# Patient Record
Sex: FEMALE | Race: WHITE | Employment: OTHER | URBAN - METROPOLITAN AREA
[De-identification: names, ages, dates, MRNs, and addresses within clinical notes are randomized per-mention and may not be internally consistent; named-entity substitution may affect disease eponyms.]

---

## 2018-11-28 ENCOUNTER — DOCUMENTATION ONLY (OUTPATIENT)
Dept: SURGERY | Age: 63
End: 2018-11-28

## 2018-11-28 NOTE — PROGRESS NOTES
Per [facility] requirements;  E-mail and letter sent for follow up appointment. [employer] [facility] [place]  [employer] [facility]  [facility]      Dear Patient,    Your health is our main concern. It is important for your health to have follow-up lab work and to see you surgeon at 2 months, 4 months, 6 months, 9 months and annually after your weight loss surgery. Additionally, the Department of Bariatric Surgery at our hospital is a member of the [employer] [address] Surgical Quality Improvement Program ([facility] NSQIP). As a participant in this program, we gather information on the outcomes of our patients after surgery. Please call the office for a follow up appointment at [phone]. If you have moved out of the area or have changed surgeons please call us and let us know the name of your doctor. Your health and feedback are important to us. We greatly appreciate your response.        Thank you,  [facility] [address] [facility]

## 2018-12-11 ENCOUNTER — OFFICE VISIT (OUTPATIENT)
Dept: SURGERY | Age: 63
End: 2018-12-11

## 2018-12-11 VITALS
HEART RATE: 77 BPM | SYSTOLIC BLOOD PRESSURE: 140 MMHG | OXYGEN SATURATION: 99 % | HEIGHT: 63 IN | DIASTOLIC BLOOD PRESSURE: 70 MMHG | WEIGHT: 186 LBS | BODY MASS INDEX: 32.96 KG/M2 | TEMPERATURE: 98.2 F

## 2018-12-11 DIAGNOSIS — K90.9 INTESTINAL MALABSORPTION, UNSPECIFIED TYPE: Primary | ICD-10-CM

## 2018-12-11 DIAGNOSIS — Z98.84 S/P BARIATRIC SURGERY: ICD-10-CM

## 2018-12-11 RX ORDER — SAME BUTANEDISULFONATE/BETAINE 400-600 MG
POWDER IN PACKET (EA) ORAL
COMMUNITY
End: 2018-12-11

## 2018-12-11 NOTE — PROGRESS NOTES
Subjective:      Remberto Colin is a 66 y.o. female is now 4 years status post laparoscopic sleeve gastrectomy. Doing well overall. She has lost a total of 31 pounds since surgery. Body mass index is 32.95 kg/m². Has lost 35% of EBW. Currently on a solid food diet without difficulty, reports weight regain and denies vomiting and abdominal pain. Taking in >40oz coffee daily. If she is not drinking coffee, she is drinking tea. Sources of protein include eggs, chicken, beef. Has been eating chips, peanut butter, chocolate. 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  She has not been exercising at this time. She is sleeping 5-6 hours each night and not always at one time. A lot of time she will wake up in the middle of the night and have difficulty going back to sleep. She has been drinking on average 1 glass of wine daily. Bowel movements are regular when eating flax seed and cosme seed meal. The patient is not having any pain. . The patient is compliant with multivitamins, calcium, Vit D and B12 supplements.      Weight Loss Metrics 12/11/2018 12/8/2016 4/8/2016 10/23/2015 6/30/2015 4/7/2015 2/5/2015   Today's Wt 186 lb 160 lb 155 lb 147 lb 154 lb 170 lb 191 lb   BMI 32.95 kg/m2 28.34 kg/m2 27.46 kg/m2 26.05 kg/m2 27.29 kg/m2 30.12 kg/m2 33.84 kg/m2        Patient Active Problem List   Diagnosis Code    Asthma J45.909    Arthritis M19.90    S/P bariatric surgery Z98.84    Intestinal malabsorption K90.9    Unspecified intestinal malabsorption K90.9        Past Medical History:   Diagnosis Date    Arthritis     ankles, knees, back, hands    Asthma     Diabetes mellitus ([address]     Dx 6 years    GERD (gastroesophageal reflux disease)     Hypertension     Morbid obesity ([address]     Other ill-defined conditions(799.89)     kidney stone    Status post bariatric surgery     terracina / sleeve resection    Unspecified intestinal malabsorption        Past Surgical History:   Procedure Laterality Date    EXCISION, INFEC GRAFT, ABDOMEN  2018    brown recluse spider bite, excision of necrotic tissue from RLQ    HX CATARACT REMOVAL      HX  SECTION      HX GYN      reanastomosis of tubes    HX HEENT      deviated septum and sinus     HX HEENT      retina repair    HX LITHOTRIPSY      HX OTHER SURGICAL      renal stone removal    HX TUBAL LIGATION      IA LAP, HERMINIO RESTRICT PROC, LONGITUDINAL GASTRECTOMY         Current Outpatient Medications   Medication Sig Dispense Refill    OTHER Indications: flax seed oil for constipation      cyanocobalamin (VITAMIN B-12) 1,000 mcg sublingual tablet Take 1 Tab by mouth daily. Sublingual 30 Tab 0    multivitamins (CHEWABLE MULTI VITAMIN) chew Take 2 Tabs by mouth daily. Chewable Tabs 180 Tab 0    Calcium Citrate-Vitamin D3 500 mg calcium -400 unit chew Take 1 Tab by mouth three (3) times daily. Space each dose at least 2 hours from the next dose 30 Tab 0    BIOTIN PO Take  by mouth.  EPINEPHrine (EPIPEN JR) 0.15 mg/0.3 mL (1:2,000) injection 0.15 mg by IntraMUSCular route once as needed.  cycloSPORINE (RESTASIS) 0.05 % ophthalmic emulsion Administer 1 Drop to both eyes two (2) times a day.  albuterol (PROVENTIL HFA, VENTOLIN HFA) 90 mcg/actuation inhaler Take 2 Puffs by inhalation every four (4) hours as needed.            Allergies   Allergen Reactions    Bee Sting [Sting, Bee] Anaphylaxis    Ceclor [Cefaclor] Rash    Erythromycin Nausea and Vomiting    Hydrocodone Itching    Penicillins Rash    Percocet [Oxycodone-Acetaminophen] Itching    Lactose Intolerance [Lactase] Unknown (comments)       Review of Systems:  General - No history or complaints of unexpected fever or chills  Head/Neck - No history or complaints of headache or dizziness  Cardiac - No history or complaints of chest pain, palpitations, or shortness of breath  Pulmonary - No history or complaints of shortness of breath or productive cough  Gastrointestinal - as noted above  Genitourinary - No history or complaints of hematuria/dysuria or renal lithiasis  Musculoskeletal - No history or complaints of joint  muscular weakness  Hematologic - No history of any bleeding episodes  Neurologic - No history or complaints of  migraine headaches or neurologic symptoms    Objective:     Visit Vitals  /70 (BP 1 Location: Left arm, BP Patient Position: Sitting)   Pulse 77   Temp 98.2 °F (36.8 °C)   Ht 5' 3\" (1.6 m)   Wt 84.4 kg (186 lb)   SpO2 99%   BMI 32.95 kg/m²       General:  alert, cooperative, no distress, appears stated age   Chest: lungs clear to auscultation, breath sounds equal and symmetric, no rhonchi, rales or wheezes, no accessory muscle use   Cor:   Regular rate and rhythm or without murmur or extra heart sounds   Abdomen: soft, bowel sounds active, non-tender, no masses or organomegaly   Incisions:   healing well, no drainage, no erythema, no hernia, no seroma, no swelling, no dehiscence, incision well approximated       Assessment:   History of Morbid obesity, status post laparoscopic sleeve gastrectomy. Doing well postoperatively. Slow weight regain - reminded that RD is always free for her and can work with her through e-mail and over the phone. Patient education given on the major factors in weight loss, sleep, hydration, diet and exercise. Stop eating carbohydrates and go back to eating meat, animal products and vegetables. Increase fluids to >64oz daily of caffeine free beverages  Stop drinking wine, it is loaded with carbohydrates and also disrupts sleep. Sleep deprivation - patient education given on the effects of sleep deprivation and weight regain  Exercise a minimum of 30 minutes daily  Asthma - f/u PCP  Plan:     1. Increase activity to the goal of 30 minutes daily  2. Discussed patients weight loss goals and dietary choices in relation to goals.   3. Reminded to measure portions, continue high protein, low carbohydrate diet. Reminded to eat regularly, to eat slowly & not to drink with meals. 4. Continue vitamin supplementation  5. Continue current medications and follow up with PCP for management of regimen. 6. Continue cardio exercise and add resistance exercises. 60-90 minutes of aerobic activity 5 days a week and strength training 2 days each week. 7. Encouraged to attend support group   8. Patient to complete labs before next visit. Lab slip given today. 9. I have discussed this plan with patient and they verbalized understanding  10. Follow up in 1 years or sooner if patient has questions, concerns or worsening of condition, if unable to reach our office, patient should report to the ED. 6. Ms. Ana Cortes has a reminder for a \"due or due soon\" health maintenance. I have asked that she contact her primary care provider for a follow-up on this health maintenance.

## 2018-12-11 NOTE — PATIENT INSTRUCTIONS
Patient Instructions      1. Remember hydration goals - minimum of 64 ounces of liquids per day (dehydration is the number one reason for hospital readmission). 2. Continue to monitor carbohydrate and protein intake you need a minimum of  Grams of protein daily- remember to keep your total carbohydrates to 50 grams or less per day for best results. 3. Continue to work towards exercise goals - 60-90 minutes, 5 times a week minimum of deliberate, aerobic exercise is the ultimate goal with strength training 2 times each week. Refer to [employer] for  information. 4. Remember to take vitamins as directed in your handbook. 5. Attend support group the 2nd Thursday of each month. 6. Constipation: Milk of Magnesia is for immediate relief. Miralax is to be used every day if constipation is a chronic problem. 7. Diarrhea: patients will occasionally develop lactose intolerance after surgery. Check to see if your protein shake has whey in it. If it does try one that does not have whey and stop all yogurts, cheeses and milks to see if the diarrhea goes away. 8. Call us at [phone] or email us through [email or web address]" with questions,     concerns or worsening of condition, we have someone on call 24 hours a day. If you are unable to reach our office, you are to go to your Primary Care Physician or the Emergency Department. Supplement Resource Guide    Importance of Protein:   Maintains lean body mass, produces antibodies to fight off infections, heals wounds, minimizes hair loss, helps to give you energy, helps with satiety, and keeping you full between meals. Importance of Calcium:  Needed for healthy bones and teeth, normal blood clotting, and nervous system functioning, higher risk of osteoporosis and bone disease with non-compliance. Importance of Multivitamins: Many functions.   Supply you with extra nutrients that you may be missing from food. May lead to iron deficiency anemia, weakness, fatigue, and many other symptoms with non-compliance. Importance of B Vitamins:  Important for red blood cell formation, metabolism, energy, and helps to maintain a healthy nervous system. Protein Supplement  Find one you like now. Use immediately after surgery. Look for:  35-50g protein each day from your protein supplement once you reach the progression diet. 0-3 g fat per serving  0-3 g sugar per serving    Protein drinks should be split in separate dosages. Recommend: Lifelong  1 year + Calcium Supplement:     Start taking within a month after surgery. Look for: Calcium Citrate Plus D (1500 mg per day)  Recommend: Citracal     .            Avoid chocolate chewable calcium. Can use chewable bariatric or [employer] brand or similar chewable. The body cannot absorb more than 500-600 mg of calcium at a time. Take for Life Multi-vitamin Supplement:      Start immediately after surgery: any complete chewable, such as: [place]s Complete chewables. Avoid [place] sours or gummies. They lack iron and other important nutrients and also have added sugar. Continue with chewable vitamin or change to adult complete multivitamin one month after surgery. Menstruating women can take a prenatal vitamin. Make sure has at least 18 mg iron and 239-616 mcg folic acid   Vitamin D69, B Complex Vitamin, and Biotin  Start taking within a month after surgery. Vitamin B12:  1000 mcg of Vitamin B12 three times weekly    Must take sublingually (meaning you take it under your tongue) or in a liquid drop form for easy absorption. B Complex Vitamin: Take a pill or liquid drop form once daily. Biotin: This vitamin can help prevent hair loss.     Recommend 5mg   (5000 mcg) a day  Biotin is Optional

## 2019-02-08 ENCOUNTER — TELEPHONE (OUTPATIENT)
Dept: SURGERY | Age: 64
End: 2019-02-08

## 2019-02-08 NOTE — TELEPHONE ENCOUNTER
Patient returned my call. I told her that her Blood Glucose level is 229 but all other labs are wnl. She has a f/u appt with her PCP next week, so she will address this issue with them at her appt. Patient is to contact our office with any problems, worsening of condition, questions or concerns. If we are not available or unable to be reached, patient is to proceed to the Emergency Department.

## 2019-05-12 NOTE — TELEPHONE ENCOUNTER
[facility] [address] [address]                                                                                                       [phone]    May 20, 2019    Fany Akins  [address] [facility] [address]      To Whom it May Concern:    Thank you for your refill request for your    PARoxetine (PAXIL) 20 MG tablet   After reviewing your chart, you are due for an updated PHQ-9 and BRAEDEN-7, which are questionnaires regarding your mood over the last 2 weeks.     Please fill them out and send it back to me.     Thank you for your time.    Trina Perez RN  [facility]           Sincerely,       Christelle Quintero M.D./HARLEY PENA     I received a fax today of the patient's lab results. Her blood glucose was 229, but all other labs are wnl. I called and left a message for the patient to return my call. I will try to call her again later today.

## 2019-12-12 ENCOUNTER — OFFICE VISIT (OUTPATIENT)
Dept: SURGERY | Age: 64
End: 2019-12-12

## 2019-12-12 ENCOUNTER — CLINICAL SUPPORT (OUTPATIENT)
Dept: SURGERY | Age: 64
End: 2019-12-12

## 2019-12-12 ENCOUNTER — HOSPITAL ENCOUNTER (OUTPATIENT)
Dept: LAB | Age: 64
Discharge: HOME | End: 2019-12-12
Payer: COMMERCIAL

## 2019-12-12 VITALS
WEIGHT: 189 LBS | DIASTOLIC BLOOD PRESSURE: 73 MMHG | OXYGEN SATURATION: 99 % | TEMPERATURE: 98.4 F | BODY MASS INDEX: 33.49 KG/M2 | HEART RATE: 68 BPM | SYSTOLIC BLOOD PRESSURE: 140 MMHG | HEIGHT: 63 IN

## 2019-12-12 VITALS — HEIGHT: 63 IN | WEIGHT: 189 LBS | BODY MASS INDEX: 33.49 KG/M2

## 2019-12-12 DIAGNOSIS — E11.9 TYPE 2 DIABETES MELLITUS WITHOUT COMPLICATION, WITHOUT LONG-TERM CURRENT USE OF INSULIN (HCC): ICD-10-CM

## 2019-12-12 DIAGNOSIS — Z98.84 S/P BARIATRIC SURGERY: Primary | ICD-10-CM

## 2019-12-12 DIAGNOSIS — M19.90 ARTHRITIS: ICD-10-CM

## 2019-12-12 DIAGNOSIS — Z98.84 S/P BARIATRIC SURGERY: ICD-10-CM

## 2019-12-12 DIAGNOSIS — K90.9 INTESTINAL MALABSORPTION, UNSPECIFIED TYPE: ICD-10-CM

## 2019-12-12 DIAGNOSIS — K90.9 INTESTINAL MALABSORPTION, UNSPECIFIED TYPE: Primary | ICD-10-CM

## 2019-12-12 LAB
ALBUMIN SERPL-MCNC: 3.8 G/DL (ref 3.4–5)
ALBUMIN/GLOB SERPL: 1.2 {RATIO} (ref 0.8–1.7)
ALP SERPL-CCNC: 107 U/L (ref 45–117)
ALT SERPL-CCNC: 35 U/L (ref 13–56)
ANION GAP SERPL CALC-SCNC: 8 MMOL/L (ref 3–18)
AST SERPL-CCNC: 20 U/L (ref 10–38)
BASOPHILS # BLD: 0 K/UL (ref 0–0.1)
BASOPHILS NFR BLD: 0 % (ref 0–2)
BILIRUB SERPL-MCNC: 0.2 MG/DL (ref 0.2–1)
BUN SERPL-MCNC: 17 MG/DL (ref 7–18)
BUN/CREAT SERPL: 26 (ref 12–20)
CALCIUM SERPL-MCNC: 8.9 MG/DL (ref 8.5–10.1)
CHLORIDE SERPL-SCNC: 107 MMOL/L (ref 100–111)
CO2 SERPL-SCNC: 25 MMOL/L (ref 21–32)
CREAT SERPL-MCNC: 0.65 MG/DL (ref 0.6–1.3)
DIFFERENTIAL METHOD BLD: NORMAL
EOSINOPHIL # BLD: 0.3 K/UL (ref 0–0.4)
EOSINOPHIL NFR BLD: 4 % (ref 0–5)
ERYTHROCYTE [DISTWIDTH] IN BLOOD BY AUTOMATED COUNT: 12.8 % (ref 11.6–14.5)
GLOBULIN SER CALC-MCNC: 3.2 G/DL (ref 2–4)
GLUCOSE SERPL-MCNC: 194 MG/DL (ref 74–99)
HCT VFR BLD AUTO: 42.5 % (ref 35–45)
HGB BLD-MCNC: 14.3 G/DL (ref 12–16)
LYMPHOCYTES # BLD: 3 K/UL (ref 0.9–3.6)
LYMPHOCYTES NFR BLD: 33 % (ref 21–52)
MCH RBC QN AUTO: 30.9 PG (ref 24–34)
MCHC RBC AUTO-ENTMCNC: 33.6 G/DL (ref 31–37)
MCV RBC AUTO: 91.8 FL (ref 74–97)
MONOCYTES # BLD: 0.5 K/UL (ref 0.05–1.2)
MONOCYTES NFR BLD: 6 % (ref 3–10)
NEUTS SEG # BLD: 5.2 K/UL (ref 1.8–8)
NEUTS SEG NFR BLD: 57 % (ref 40–73)
PLATELET # BLD AUTO: 232 K/UL (ref 135–420)
PMV BLD AUTO: 9.9 FL (ref 9.2–11.8)
POTASSIUM SERPL-SCNC: 4 MMOL/L (ref 3.5–5.5)
PROT SERPL-MCNC: 7 G/DL (ref 6.4–8.2)
RBC # BLD AUTO: 4.63 M/UL (ref 4.2–5.3)
SODIUM SERPL-SCNC: 140 MMOL/L (ref 136–145)
WBC # BLD AUTO: 9.1 K/UL (ref 4.6–13.2)

## 2019-12-12 PROCEDURE — 82306 VITAMIN D 25 HYDROXY: CPT

## 2019-12-12 PROCEDURE — 80053 COMPREHEN METABOLIC PANEL: CPT

## 2019-12-12 PROCEDURE — 83540 ASSAY OF IRON: CPT

## 2019-12-12 PROCEDURE — 82607 VITAMIN B-12: CPT

## 2019-12-12 PROCEDURE — 84425 ASSAY OF VITAMIN B-1: CPT

## 2019-12-12 PROCEDURE — 85025 COMPLETE CBC W/AUTO DIFF WBC: CPT

## 2019-12-12 PROCEDURE — 82728 ASSAY OF FERRITIN: CPT

## 2019-12-12 PROCEDURE — 36415 COLL VENOUS BLD VENIPUNCTURE: CPT

## 2019-12-12 RX ORDER — METFORMIN HYDROCHLORIDE 500 MG/1
TABLET ORAL 2 TIMES DAILY WITH MEALS
COMMUNITY

## 2019-12-12 NOTE — PATIENT INSTRUCTIONS
Patient Instructions      1. Remember hydration goals - minimum of 64 ounces of liquids per day (dehydration is the number one reason for hospital readmission). 2. Continue to monitor carbohydrate and protein intake you need a minimum of  Grams of protein daily- remember to keep your total carbohydrates to 50 grams or less per day for best results. 3. Continue to work towards exercise goals - 60-90 minutes, 5 times a week minimum of deliberate, aerobic exercise is the ultimate goal with strength training 2 times each week. Refer to [employer] for  information. 4. Remember to take vitamins as directed. 5. Attend support group the 2nd Thursday of each month. 6. Use Miralax if you become constipated. 7. Call us at [phone] or email us through [email or web address]" with questions,     concerns or worsening of condition, we have someone on call 24 hours a day. If you are unable to reach our office, you are to go to your Primary Care Physician or the Emergency Department. 8. If any labs have been ordered as a part of this visit, you will only be contacted if found to be abnormal. If you would like to look at the results for yourself, you can sign up for 'My Chart\". [facility] office staff can offer you assistance with setting that up. Warnings and Precautions for Saxenda   Risk of Thyroid C-cell Tumors: If serum calcitonin is measured and found to be elevated, the patient should be further evaluated. Patients with thyroid nodules noted on physical examination or neck imaging should also be further evaluated    Acute Pancreatitis: Acute pancreatitis, including fatal and non-fatal hemorrhagic or necrotizing pancreatitis, has been observed in patients treated with liraglutide postmarketing.  Observe patients carefully for signs and symptoms of pancreatitis (persistent severe abdominal pain, sometimes radiating to the back with or without vomiting). If pancreatitis is suspected, discontinue Saxenda® promptly and if pancreatitis is confirmed, do not restart    Acute Gallbladder Disease: Substantial or rapid weight loss can increase the risk of cholelithiasis; however, the incidence of acute gallbladder disease was greater in patients treated with Saxenda® than with placebo even after accounting for the degree of weight loss. If cholelithiasis is suspected, gallbladder studies and appropriate clinical follow-up are indicated    Risk of Hypoglycemia with Concomitant Use of Anti-Diabetic Therapy: When [place] is used with an insulin secretagogue (eg, a sulfonylurea) serious hypoglycemia can occur. Consider lowering the dose of the insulin secretagogue to reduce the risk of hypoglycemia. Monitor blood glucose parameters prior to starting [place] and during treatment and adjust anti-diabetic drugs as needed    Heart Rate Increase: Mean increases in resting heart rate of 2 to 3 beats per minute (bpm) were observed in patients treated with Saxenda®. Monitor heart rate at regular intervals and inform patients to report palpitations or feelings of a racing heartbeat while at rest during treatment with [place]. Discontinue Saxenda® in patients who experience a sustained increase in resting heart rate    Renal Impairment: Acute renal failure and worsening of chronic renal failure, which may sometimes require hemodialysis, have been reported, usually in association with nausea, vomiting, diarrhea, or dehydration. Use caution when initiating or escalating doses of Saxenda® in patients with renal impairment    Hypersensitivity Reactions: Serious hypersensitivity reactions (eg, anaphylaxis and angioedema) have been reported in patients treated with liraglutide. If a hypersensitivity reaction occurs, patients should stop taking Saxenda® and promptly seek medical advice    Suicidal Behavior and Ideation:  In clinical trials, 9 (0.3%) of 3,384 patients treated with Saxenda® and 2 (0.1%) of the 1,941 treated with placebo reported suicidal ideation; one of the patients treated with Saxenda® attempted suicide. Monitor patients on Saxenda® for the emergence or worsening of depression, suicidal thoughts or behavior, and/or any unusual changes in mood or behavior. Discontinue treatment if patients experience suicidal thoughts or behaviors. Avoid Saxenda® in patients with a history of suicidal attempts or active suicidal ideation  Adverse Events    The most common adverse reactions, reported in ?5% are: nausea, hypoglycemia, diarrhea, constipation, vomiting, headache, decreased appetite, dyspepsia, fatigue, dizziness, abdominal pain, and increased lipase  Drug Interactions   Saxenda® causes a delay of gastric emptying, and has the potential to impact the absorption of concomitantly administered oral medications. Monitor for potential consequences of delayed absorption of oral medications concomitantly administered with Saxenda®      Supplement Resource Guide    Importance of Protein:   Maintains lean body mass, produces antibodies to fight off infections, heals wounds, minimizes hair loss, helps to give you energy, helps with satiety, and keeping you full between meals. Importance of Calcium:  Needed for healthy bones and teeth, normal blood clotting, and nervous system functioning, higher risk of osteoporosis and bone disease with non-compliance. Importance of Multivitamins: Many functions. Supply you with extra nutrients that you may be missing from food. May lead to iron deficiency anemia, weakness, fatigue, and many other symptoms with non-compliance. Importance of B Vitamins:  Important for red blood cell formation, metabolism, energy, and helps to maintain a healthy nervous system. Protein Supplement  Find one you like now. Use immediately after surgery.    Look for:  35-50g protein each day from your protein supplement once you reach the progression diet.      0-3 g fat per serving  0-3 g sugar per serving    Protein drinks should be split in separate dosages. Recommend: Lifelong  1 year + Calcium Supplement:     Start taking within a month after surgery. Look for: Calcium Citrate Plus D (1500 mg per day)  Recommend: Citracal     .            Avoid chocolate chewable calcium. Can use chewable bariatric or [employer] brand or similar chewable. The body cannot absorb more than 500-600 mg of calcium at a time. Take for Life Multi-vitamin Supplement:      Start immediately after surgery: any complete chewable, such as: [place]s Complete chewables. Avoid [place] sours or gummies. They lack iron and other important nutrients and also have added sugar. Continue with chewable vitamin or change to adult complete multivitamin one month after surgery. Menstruating women can take a prenatal vitamin. Make sure has at least 18 mg iron and 506-544 mcg folic acid   Vitamin X73, B Complex Vitamin, and Biotin  Start taking within a month after surgery. Vitamin B12:  1000 mcg of Vitamin B12 three times weekly    Must take sublingually (meaning you take it under your tongue) or in a liquid drop form for easy absorption. B Complex Vitamin: Take a pill or liquid drop form once daily. Biotin: This vitamin can help prevent hair loss. Recommend 5mg   (5000 mcg) a day  Biotin is Optional              Learning About Physical Activity  What is physical activity? Physical activity is any kind of activity that gets your body moving. The types of physical activity that can help you get fit and stay healthy include:  · Aerobic or \"cardio\" activities that make your heart beat faster and make you breathe harder, such as brisk walking, riding a bike, or running. Aerobic activities strengthen your heart and lungs and build up your endurance.   · Strength training activities that make your muscles work against, or \"resist,\" something, such as lifting weights or doing push-ups. These activities help tone and strengthen your muscles. · Stretches that allow you to move your joints and muscles through their full range of motion. Stretching helps you be more flexible and avoid injury. What are the benefits of physical activity? Being active is one of the best things you can do to get fit and stay healthy. It helps you to:  · Feel stronger and have more energy to do all the things you like to do. · Focus better at school or work and perform better in sports. · Feel, think, and sleep better. · Reach and stay at a healthy weight. · Lose fat and build lean muscle. · Lower your risk for serious health problems. · Keep your bones, muscles, and joints strong. Being fit lets you do more physical activity. And it lets you work out harder without as much effort. How can you make physical activity part of your life? Get at least 30 minutes of exercise on most days of the week. Walking is a good choice. You also may want to do other activities, such as running, swimming, cycling, or playing tennis or team sports. Pick activities that you like--ones that make your heart beat faster, your muscles stronger, and your muscles and joints more flexible. If you find more than one thing you like doing, do them all. You don't have to do the same thing every day. Get your heart pumping every day. Any activity that makes your heart beat faster and keeps it at that rate for a while counts. Here are some great ways to get your heart beating faster:  · Go for a brisk walk, run, or bike ride. · Go for a hike or swim. · Go in-line skating. · Play a game of touch football, basketball, or soccer. · Ride a bike. · Play tennis or racquetball. · Climb stairs. Even some household chores can be aerobic--just do them at a faster pace. Vacuuming, raking or mowing the lawn, sweeping the garage, and washing and waxing the car all can help get your heart rate up.   Strengthen your muscles during the week. You don't have to lift heavy weights or grow big, bulky muscles to get stronger. Doing a few simple activities that make your muscles work against, or \"resist,\" something can help you get stronger. For example, you can:  · Do push-ups or sit-ups, which use your own body weight as resistance. · Lift weights or dumbbells or use stretch bands at home or in a gym or community center. Stretch your muscles often. Stretching will help you as you become more active. It can help you stay flexible, loosen tight muscles, and avoid injury. It can also help improve your balance and posture and can be a great way to relax. Be sure to stretch the muscles you'll be using when you work out. It's best to warm your muscles slightly before you stretch them. Walk or do some other light aerobic activity for a few minutes, and then start stretching. When you stretch your muscles:  · Do it slowly. Stretching is not about going fast or making sudden movements. · Don't push or bounce during a stretch. · Hold each stretch for at least 15 to 30 seconds, if you can. You should feel a stretch in the muscle, but not pain. · Breathe out as you do the stretch. Then breathe in as you hold the stretch. Don't hold your breath. If you're worried about how more activity might affect your health, have a checkup before you start. Follow any special advice your doctor gives you for getting a smart start. Where can you learn more? Go to http://ricardo-maik.info/. Enter D483 in the search box to learn more about \"Learning About Physical Activity. \"  Current as of: May 5, 2019  Content Version: 12.2  © 8647-6157 [employer]. Care instructions adapted under license by [employer] (which disclaims liability or warranty for this information).  If you have questions about a medical condition or this instruction, always ask your healthcare professional. Babita Faith any warranty or liability for your use of this information. no

## 2019-12-12 NOTE — PROGRESS NOTES
Renetta Goff  is a 60 y.o. female who presents for follow-up about 5 years following laparoscopic sleeve gastrectomy. She has lost a total of 28 pounds since surgery. Body mass index is 33.48 kg/m². . EBWL is (31%). Visit Vitals  Ht 5' 3\" (1.6 m)   Wt 85.7 kg (189 lb)   BMI 33.48 kg/m²     Vitamin/mineral supplements: Vitamin D, Multivitamin, B complex and Calcium    Diet History:  Typical intake is as follows:  Breakfast: 2 eggs with 1 sausage sohan - and 1 hand ful of mixed vegetables and butter and flax seed/cosme seed meal with black coffee   Snack: Protein bar   Lunch: Deli turkey OR ham with salad greens   Dinner: Ground beef with beans OR Rotisserie/baked chicken (legs/thigh) with cauliflower rice   Snacks: 6 oz wine (5-7 days per week) - 2 Tbsp peanut butter, cheez nibs   Fluids: 50-70 oz water, -wine 5-7 days per week    Dietary Instructions     Reviewed intake  Understanding label reading  Understanding low carbohydrates, low sugar, higher protein meals  Understanding proper portions  Instruction given for personal dietary changes  Reviewed portion gudie for month 9+   Comments: Pt given brief pre/post-op diet ed and diet hx reviewed.      Summary:  After reviewing regular intake portions - RD reviewed recommend diet progression following surgery. Reviewed addition of simple sugars, excessive fat with protein portions, and displacement of nutrient rich foods with high calorie low nutrient convenience options. We reviewed the bariatric plate method for meal planning with portion guide. Assisted with goal setting, provided recommend snack list, easy to prepare high protein, low carbohydrate food choices, provided handout reviewing complex carbohydrate vs simple sugars and reviewed appropriate portion sizes. Encouraged compliance with protein supplements, vitamins and mineral supplementations, fluid, and  continuing physical activity.        Alla Ghotra RD

## 2019-12-12 NOTE — PROGRESS NOTES
Subjective:     Linda Prather  is a 60 y.o. female who presents for follow-up about 5 years following laparoscopic sleeve gastrectomy. She has lost a total of 28 pounds since surgery. Body mass index is 33.48 kg/m². . EBWL is (31%). The patient presents today to assess their progress toward their goal of weight loss and to address any issues that may be present. Today the patient and I have reviewed their diet and how appropriate their food choices are. The following issues have been identified - weight regain since surgery. Most weight lost was 10.5 months postop when she weighed 147 lbs. She had lost 70 lbs, 79% EBWL. Doesn't recall any one contributing factor to regain, thinks some due to late night snacking, added back more carbs. Surgery related complication: NA       She reports no real issues and denies vomiting, abdominal pain, diarrhea and nausea. The patients diet choices have been reviewed today and are as follows:      Breakfast: paleo pancake with veggies      Lunch: salad with meat      Snack: late night PB, cheese nips, chocolate      Supper: chicken with veggies  Problem with late night snacking, from 8-12pm.    Patients pain score:0/10    The patient's exercise level: not active. Walking 30 minutes 1-2 times per week. Changes in her medical history and medications have been reviewed. PCP restarted metformin for diabetes. Stress from sick  diagnosed with bladder cancer. Retired from teaching, was working at a back. Only sleeping about 4-6 hours a night.     Comorbidities:    Hypertension: resolved, taken off medication, but higher reading in office today  Diabetes: worsened, started back on metformin  Obstructive Sleep Apnea: improved  Hyperlipidemia: not applicable  Stress Urinary Incontinence: not applicable  Gastroesophageal Reflux: resolved  Weight related arthropathy:improved    Patient Active Problem List   Diagnosis Code    Asthma J45.909    Arthritis M19.90    S/P bariatric surgery Z98.84    Intestinal malabsorption K90.9    Unspecified intestinal malabsorption K90.9     Past Medical History:   Diagnosis Date    Arthritis     ankles, knees, back, hands    Asthma     Diabetes mellitus ([address]     Dx 6 years    GERD (gastroesophageal reflux disease)     Hypertension     Morbid obesity ([address]     Other ill-defined conditions(799.89)     kidney stone    Status post bariatric surgery     terracina / sleeve resection    Unspecified intestinal malabsorption      Past Surgical History:   Procedure Laterality Date    EXCISION, INFEC GRAFT, ABDOMEN  2018    brown recluse spider bite, excision of necrotic tissue from RLQ    HX CATARACT REMOVAL      HX  SECTION      HX GYN      reanastomosis of tubes    HX HEENT      deviated septum and sinus     HX HEENT      retina repair    HX LITHOTRIPSY      HX OOPHORECTOMY      HX OTHER SURGICAL      renal stone removal    HX TUBAL LIGATION      [place] LAP, HERMINIO RESTRICT PROC, LONGITUDINAL GASTRECTOMY       Current Outpatient Medications   Medication Sig Dispense Refill    metFORMIN (GLUCOPHAGE) 500 mg tablet Take  by mouth two (2) times daily (with meals).  OTHER Indications: flax seed oil for constipation      cyanocobalamin (VITAMIN B-12) 1,000 mcg sublingual tablet Take 1 Tab by mouth daily. Sublingual 30 Tab 0    multivitamins (CHEWABLE MULTI VITAMIN) chew Take 2 Tabs by mouth daily. Chewable Tabs 180 Tab 0    Calcium Citrate-Vitamin D3 500 mg calcium -400 unit chew Take 1 Tab by mouth three (3) times daily. Space each dose at least 2 hours from the next dose 30 Tab 0    BIOTIN PO Take  by mouth.  EPINEPHrine (EPIPEN JR) 0.15 mg/0.3 mL (1:2,000) injection 0.15 mg by IntraMUSCular route once as needed.  albuterol (PROVENTIL HFA, VENTOLIN HFA) 90 mcg/actuation inhaler Take 2 Puffs by inhalation every four (4) hours as needed.             Review of Symptoms:       General - No history or complaints of unexpected fever or chills  Head/Neck - No history or complaints of headache or dizziness  Cardiac - No history or complaints of chest pain, palpitations, or shortness of breath  Pulmonary - No history or complaints of shortness of breath or productive cough  Gastrointestinal - as noted above  Genitourinary - No history or complaints of hematuria/dysuria or renal lithiasis  Musculoskeletal - No history or complaints of joint  muscular weakness  Hematologic - No history of any bleeding episodes  Neurologic - No history or complaints of  migraine headaches or neurologic symptoms                     Objective:     Visit Vitals  /73 (BP 1 Location: Right arm, BP Patient Position: Sitting)   Pulse 68   Temp 98.4 °F (36.9 °C)   Ht 5' 3\" (1.6 m)   Wt 85.7 kg (189 lb)   SpO2 99%   BMI 33.48 kg/m²        Physical Exam:    General:  alert, cooperative, no distress, appears stated age   Lungs:   clear to auscultation bilaterally   Heart:  Regular rate and rhythm, S1S2 present or without murmur or extra heart sounds   Abdomen:   abdomen is soft without significant tenderness, masses, organomegaly or guarding; Incisions: healed       Lab Results   Component Value Date/Time    WBC 6.8 10/23/2015 12:00 AM    HGB 14.2 10/23/2015 12:00 AM    HCT 42.4 10/23/2015 12:00 AM    PLATELET 110 87/41/6848 12:00 AM    MCV 93 10/23/2015 12:00 AM     Lab Results   Component Value Date/Time    Sodium 143 12/08/2016 09:20 AM    Potassium 5.1 12/08/2016 09:20 AM    Chloride 105 12/08/2016 09:20 AM    CO2 32 12/08/2016 09:20 AM    Anion gap 6 12/08/2016 09:20 AM    Glucose 106 (H) 12/08/2016 09:20 AM    BUN 24 (H) 12/08/2016 09:20 AM    Creatinine 0.57 (L) 12/08/2016 09:20 AM    BUN/Creatinine ratio 42 (H) 12/08/2016 09:20 AM    GFR est AA >60 12/08/2016 09:20 AM    GFR est non-AA >60 12/08/2016 09:20 AM    Calcium 9.5 12/08/2016 09:20 AM    Bilirubin, total 0.4 12/08/2016 09:20 AM    AST (SGOT) 20 12/08/2016 09:20 AM    Alk.  phosphatase 69 12/08/2016 09:20 AM    Protein, total 6.8 12/08/2016 09:20 AM    Albumin 3.8 12/08/2016 09:20 AM    Globulin 3.0 12/08/2016 09:20 AM    A-G Ratio 1.3 12/08/2016 09:20 AM    ALT (SGPT) 46 12/08/2016 09:20 AM     Lab Results   Component Value Date/Time    Iron 77 12/08/2016 09:20 AM    Ferritin 71 12/08/2016 09:20 AM     Lab Results   Component Value Date/Time    Folate >20.0 (H) 12/08/2016 09:20 AM     Lab Results   Component Value Date/Time    Vitamin D 25-Hydroxy 70.0 12/08/2016 09:21 AM           Assessment:     1. History of Morbid obesity, status post  laparoscopic sleeve gastrectomy. The patient is doing well from a surgical standpoint, but has 42 weight regain since her lowest EBWL 10.5 months after surgery. Reviewed general guidelines and patient has appointment with dietitian following this visit. - Exercise a minimum of 30 minutes daily.    - Fluid intake >64oz daily or more of sugar free and caffeine free fluids.  - Sleep goal is 7-9 hours each night. - Strict diet control, patient is to keep carbohydrate consumption <50g daily for additional weight loss. Reminded that dietitian is always a free resource. 2. Weight regain - discussed Saxenda as pharmacologic tool, booklet given and pen teaching demonstration done, patient is going to call the patient line to see if she has prescription coverage and let us know if interested in starting. Discussed adverse reactions, contraindications, warnings, and precautions according to the rx info reviewed with pt. Pt aware of risk of hypoglycemia and will monitor and record her blood sugars. 3. DM - on metformin, f/u PCP  4. Hx of HTN - off Rx, but elevated reading in office, continue to monitor and f/u PCP         Plan:     1. Remember to measure portions, continue low carbohydrate diet  2. Continue to concentrate on protein intake meeting daily requirements  3. Remember vitamin supplements.  The importance of such was discussed regarding the malabsorptive issues that the surgery creates. 4. Exercise regimen appears to be: inadequate, 150 minutes minimum of cardio weekly  5. Try and attend support group if feasible. 6. Follow-up in 1 month(s) if she decides to start [address] [place] [address], otherwise 6 months. 7. Lab reviewed and appropriate changes made. Lab slip given for yearly labs. 8. Total time spent with the patient 30 minutes.

## 2019-12-13 LAB
25(OH)D3 SERPL-MCNC: 30.1 NG/ML (ref 30–100)
FERRITIN SERPL-MCNC: 136 NG/ML (ref 8–388)
FOLATE SERPL-MCNC: >20 NG/ML (ref 3.1–17.5)
IRON SERPL-MCNC: 44 UG/DL (ref 50–175)
VIT B12 SERPL-MCNC: >2000 PG/ML (ref 211–911)

## 2019-12-17 LAB — VIT B1 BLD-SCNC: 167.1 NMOL/L (ref 66.5–200)

## 2019-12-17 NOTE — PROGRESS NOTES
Spoke with patient about lab results. She will add 2000 units vit D OTC daily and Slo-Fe once a day to her vitamin regimen. Had been cooking with a cast iron pots, but discssed downward trending iron over last several labs checks. Also, she is aware of non-fasting BS of 194. On oral diabetic agent and will f/u with PCP.

## 2020-05-11 ENCOUNTER — OFFICE VISIT (OUTPATIENT)
Dept: SURGERY | Age: 65
End: 2020-05-11

## 2020-05-11 VITALS — HEIGHT: 63 IN | WEIGHT: 189 LBS | BODY MASS INDEX: 33.49 KG/M2

## 2020-05-11 DIAGNOSIS — Z98.84 S/P BARIATRIC SURGERY: Primary | ICD-10-CM

## 2020-05-11 NOTE — PROGRESS NOTES
Melanie Jordan  is a 60 y.o. female who presents for follow-up about 5.5 years following laparoscopic sleeve gastrectomy. She has lost a total of 28 pounds since surgery. Body mass index is 33.48 kg/m². . EBWL is (31%). Visit Vitals  Ht 5' 3\" (1.6 m)   Wt 85.7 kg (189 lb)   BMI 33.48 kg/m²     Vitamin/mineral supplements: Vitamin D, Multivitamin, B complex and Calcium    Diet History:  Typical intake is as follows:  Breakfast: 2 eggs with 1 sausage sohan - and 1 hand ful of mixed vegetables and butter and flax seed/cosme seed meal with black coffee   Snack: Protein bar   Lunch: Deli turkey OR ham with salad greens   Dinner: Ground beef with beans OR Rotisserie/baked chicken (legs/thigh) with cauliflower rice   Snacks: 6 oz wine (5-7 days per week) - 2 Tbsp peanut butter, cheez nips, potato chips - starting stress eating at around 8-9 pm and will consume \"snacky\" carbohydrate foods for 0.5-1 hour. Fluids: 50-70 oz water, -wine 5-7 days per week    Patient has not yet started physical activity regimen, reinforced the importance of regular physical activity for total health and weight management. Suggested starting walking and resistance training for at least 4-5 days per week for 30-60 minutes, patient was receptive to recommendation. Dietary Instructions     Reviewed intake  Understanding label reading  Understanding low carbohydrates, low sugar, higher protein meals  Understanding proper portions  Instruction given for personal dietary changes  Reviewed portion gudie for month 9+   Comments: Pt given brief pre/post-op diet ed and diet hx reviewed.      Summary:  After reviewing regular intake portions - RD reviewed recommend diet progression following surgery. Reviewed addition of simple sugars, excessive fat with protein portions, and displacement of nutrient rich foods with high calorie low nutrient convenience options.     Patient spouse was recently diagnosed with bladder cancer and she finds that she has been trying to manage helping him maintain his weight and care for him. We reviewed the bariatric plate method for meal planning with portion guide. Assisted with goal setting, provided recommend snack list, easy to prepare high protein, low carbohydrate food choices, provided handout reviewing complex carbohydrate vs simple sugars and reviewed appropriate portion sizes. Encouraged compliance with protein supplements, vitamins and mineral supplementations, fluid, and  continuing physical activity.        Agueda Madera RD

## 2020-11-13 ENCOUNTER — DOCUMENTATION ONLY (OUTPATIENT)
Dept: SURGERY | Age: 65
End: 2020-11-13

## 2020-11-13 NOTE — PROGRESS NOTES
Per [facility] requirements;  E-mail and letter sent for follow up appointment. [facility] Surgical Specialist  [address] [facility], [address] [facility] [place]  [facility]  [facility]      Dear Patient,    Your health is our main concern. It is important for your health to have follow-up lab work and to see your surgeon at 2 months, 4 months, 6 months, 9 months and annually after your weight loss surgery. Additionally, the Department of Bariatric Surgery at our hospital is a member of the [employer] of [address] [place] [address] Surgical Quality Improvement Program ([facility] NSQIP). As a participant in this program, we gather information on the outcomes of our patients after surgery. Please call the office for a follow up appointment at [phone]. If you have moved out of the area or have changed surgeons please call us and let us know the name of your doctor. Your health and feedback are important to us. We greatly appreciate your response.        Thank you,  [facility] [place] [facility] [address]

## 2021-03-02 ENCOUNTER — OFFICE VISIT (OUTPATIENT)
Dept: SURGERY | Age: 66
End: 2021-03-02
Payer: COMMERCIAL

## 2021-03-02 VITALS
SYSTOLIC BLOOD PRESSURE: 140 MMHG | HEART RATE: 65 BPM | WEIGHT: 210 LBS | OXYGEN SATURATION: 96 % | BODY MASS INDEX: 37.21 KG/M2 | DIASTOLIC BLOOD PRESSURE: 72 MMHG | TEMPERATURE: 98.5 F | HEIGHT: 63 IN

## 2021-03-02 DIAGNOSIS — K90.9 INTESTINAL MALABSORPTION, UNSPECIFIED TYPE: Primary | ICD-10-CM

## 2021-03-02 DIAGNOSIS — M19.90 ARTHRITIS: ICD-10-CM

## 2021-03-02 DIAGNOSIS — Z98.84 S/P BARIATRIC SURGERY: ICD-10-CM

## 2021-03-02 DIAGNOSIS — E11.9 TYPE 2 DIABETES MELLITUS WITHOUT COMPLICATION, WITHOUT LONG-TERM CURRENT USE OF INSULIN (HCC): ICD-10-CM

## 2021-03-02 PROCEDURE — 99214 OFFICE O/P EST MOD 30 MIN: CPT | Performed by: NURSE PRACTITIONER

## 2021-03-02 RX ORDER — GLIPIZIDE 5 MG/1
TABLET, FILM COATED, EXTENDED RELEASE ORAL
COMMUNITY
Start: 2021-03-01

## 2021-03-02 RX ORDER — LOSARTAN POTASSIUM 25 MG/1
TABLET ORAL
COMMUNITY
Start: 2021-01-04

## 2021-03-02 NOTE — PROGRESS NOTES
Subjective:     Hebert Samuel  is a 61 y.o. female who presents for follow-up about 6.25 years following laparoscopic sleeve gastrectomy. She has lost a total of 7 pounds since surgery. Body mass index is 37.2 kg/m². . EBWL is (8%). The patient presents today to assess their progress toward their goal of weight loss and to address any issues that may be present. Today the patient and I have reviewed their diet and how appropriate their food choices are. The following issues have been identified - none from a surgical standpoint. Has had a 21 lb weight gain since her visit last year. Feels like some related to covid quarantine living and decreased activity and snacking. Has also had to resume 2nd medication for worsening diabetes and HgA1c that increased to 10.5%. Has been trying to cut back on carbs (biscuits) and decrease snacking, recent lab check had HgA1c drop to 8.2%. She is going to fax over recent labs for us to review. Most weight lost was 10.5 months postop when she weighed 147 lbs. She had lost 70 lbs, 79% EBWL. Surgery related complication: NA       She reports no real issues and denies vomiting, abdominal pain, diarrhea and reflux. The patients diet choices have been reviewed today and counseling was given. Fluid intake: only about 30oz daily      Protein intake: no supplements,not tracking, eating eggs, chicken, ground beef      Meals/day: 5-6    Patients pain score:0/10    The patient's exercise level: not active. Trying to improve, walking 30-40 minutes 1-2 days/week    Changes in her medical history and medications have been reviewed.     Comorbidities:    Hypertension: resolved, added back on Rx for renal protection for DM  Diabetes: worsened, started back on metformin + glipizide  Obstructive Sleep Apnea: improved  Hyperlipidemia: not applicable  Stress Urinary Incontinence: not applicable  Gastroesophageal Reflux: resolved  Weight related arthropathy:improved, using topical NSAID and heat which helps    Patient Active Problem List   Diagnosis Code    Asthma J45.909    Arthritis M19.90    Diabetes mellitus ([facility] E11.9    S/P bariatric surgery Z98.84    Intestinal malabsorption K90.9    Unspecified intestinal malabsorption K90.9     Past Medical History:   Diagnosis Date    Arthritis     ankles, knees, back, hands    Asthma     Diabetes mellitus ([facility]     Dx 6 years    GERD (gastroesophageal reflux disease)     Hypertension     Morbid obesity ([facility]     Other ill-defined conditions(799.89)     kidney stone    Status post bariatric surgery     terracina / sleeve resection    Unspecified intestinal malabsorption      Past Surgical History:   Procedure Laterality Date    EXCISION, INFEC GRAFT, ABDOMEN  2018    brown recluse spider bite, excision of necrotic tissue from RLQ    HX CATARACT REMOVAL      HX  SECTION      HX GYN      reanastomosis of tubes    HX HEENT      deviated septum and sinus     HX HEENT      retina repair    HX LITHOTRIPSY      HX OOPHORECTOMY      HX OTHER SURGICAL      renal stone removal    HX TUBAL LIGATION      MI LAP, HERMINIO RESTRICT PROC, LONGITUDINAL GASTRECTOMY       Current Outpatient Medications   Medication Sig Dispense Refill    glipiZIDE SR (GLUCOTROL XL) 5 mg CR tablet       losartan (COZAAR) 25 mg tablet TAKE ONE TABLET BY MOUTH EVERY DAY      metFORMIN (GLUCOPHAGE) 500 mg tablet Take  by mouth two (2) times daily (with meals).  OTHER Indications: flax seed oil for constipation      cyanocobalamin (VITAMIN B-12) 1,000 mcg sublingual tablet Take 1 Tab by mouth daily. Sublingual 30 Tab 0    multivitamins (CHEWABLE MULTI VITAMIN) chew Take 2 Tabs by mouth daily. Chewable Tabs 180 Tab 0    Calcium Citrate-Vitamin D3 500 mg calcium -400 unit chew Take 1 Tab by mouth three (3) times daily. Space each dose at least 2 hours from the next dose 30 Tab 0    BIOTIN PO Take  by mouth.       EPINEPHrine (EPIPEN JR) 0.15 mg/0.3 mL (1:2,000) injection 0.15 mg by IntraMUSCular route once as needed.     • albuterol (PROVENTIL HFA, VENTOLIN HFA) 90 mcg/actuation inhaler Take 2 Puffs by inhalation every four (4) hours as needed.            Review of Symptoms:       General - No history or complaints of unexpected fever or chills  Head/Neck - No history or complaints of headache or dizziness  Cardiac - No history or complaints of chest pain, palpitations, or shortness of breath  Pulmonary - No history or complaints of shortness of breath or productive cough  Gastrointestinal - as noted above  Genitourinary - No history or complaints of hematuria/dysuria or renal lithiasis  Musculoskeletal - No history or complaints of joint  muscular weakness  Hematologic - No history of any bleeding episodes  Neurologic - No history or complaints of  migraine headaches or neurologic symptoms                     Objective:     Visit Vitals  BP (!) 140/72 (BP 1 Location: Right arm, BP Patient Position: Sitting, BP Cuff Size: Adult long)   Pulse 65   Temp 98.5 °F (36.9 °C)   Ht 5' 3\" (1.6 m)   Wt 95.3 kg (210 lb)   SpO2 96%   BMI 37.20 kg/m²        Physical Exam:      General appearance:  alert, cooperative, no distress, appears stated age   Mental status   alert, oriented to person, place, and time   Neck  supple, no significant adenopathy     Lymphatics  no palpable lymphadenopathy, no hepatosplenomegaly   Chest  clear to auscultation, no wheezes, rales or rhonchi, symmetric air entry   Heart  normal rate, regular rhythm, normal S1, S2, no murmurs, rubs, clicks or gallops    Abdomen: soft, nontender, nondistended, no masses or organomegaly   Incision:  healing well, no drainage, no erythema, no hernia, no seroma, no swelling, no dehiscence, incision well approximated      Neurological  alert, oriented, normal speech, no focal findings or movement disorder noted   Musculoskeletal no joint tenderness, deformity or swelling   Extremities peripheral pulses normal, no  pedal edema, no clubbing or cyanosis   Skin normal coloration and turgor, no rashes, no suspicious skin lesions noted     Lab Results   Component Value Date/Time    WBC 9.1 12/12/2019 05:14 PM    HGB 14.3 12/12/2019 05:14 PM    HCT 42.5 12/12/2019 05:14 PM    PLATELET 798 10/95/2001 05:14 PM    MCV 91.8 12/12/2019 05:14 PM     Lab Results   Component Value Date/Time    Sodium 140 12/12/2019 05:14 PM    Potassium 4.0 12/12/2019 05:14 PM    Chloride 107 12/12/2019 05:14 PM    CO2 25 12/12/2019 05:14 PM    Anion gap 8 12/12/2019 05:14 PM    Glucose 194 (H) 12/12/2019 05:14 PM    BUN 17 12/12/2019 05:14 PM    Creatinine 0.65 12/12/2019 05:14 PM    BUN/Creatinine ratio 26 (H) 12/12/2019 05:14 PM    GFR est AA >60 12/12/2019 05:14 PM    GFR est non-AA >60 12/12/2019 05:14 PM    Calcium 8.9 12/12/2019 05:14 PM    Bilirubin, total 0.2 12/12/2019 05:14 PM    Alk. phosphatase 107 12/12/2019 05:14 PM    Protein, total 7.0 12/12/2019 05:14 PM    Albumin 3.8 12/12/2019 05:14 PM    Globulin 3.2 12/12/2019 05:14 PM    A-G Ratio 1.2 12/12/2019 05:14 PM    ALT (SGPT) 35 12/12/2019 05:14 PM     Lab Results   Component Value Date/Time    Iron 44 (L) 12/12/2019 05:14 PM    Ferritin 136 12/12/2019 05:14 PM     Lab Results   Component Value Date/Time    Folate >20.0 (H) 12/12/2019 05:14 PM     Lab Results   Component Value Date/Time    Vitamin D 25-Hydroxy 30.1 12/12/2019 05:14 PM           Assessment and Plan:   1. Intestinal malabsorption  a. continue required Vitamins: B12, B complex, D, iron, calcium, multivitamin  2. S/p laparoscopic bariatric surgery, SLEEVE GASTRECTOMY, history of morbid obesity. The patient is doing well from a surgical standpoint, but has 63 lb weight regain since her lowest EBWL at 10.5 months after surgery. Reviewed general guidelines and patient will try to get patient an appointment with dietitian later this spring once we have replaced the position.  For now, focus on 80-90g protein, 800-1000 calories and less than 50g carbs a day. Recommend using a food tracking floresita. Consider adding back daily protein shake  a. Sleep goal is 7-9 hours each night. Patient education given on the effects of sleep deprivation on weight control. b. Discussed patients weight loss goals and dietary choices in relation to goals. c. Reminded to measure portions, continue high protein, low carbohydrate diet. Reminded to eat regularly, to eat slowly & not to drink with meals. d. Continue cardio exercise and add resistance exercises. 60-90 minutes of aerobic activity 5 days a week and strength training 2 days each week. e. Encouraged to attend support group   f. Required fluid intake is >64oz daily of decaffeinated sugar free beverages. 3. DM - on Rx, f/u PCP    Labs to be faxed from PCP and will order anything needed for yearly labs    Follow up in 1 year or sooner if patient has questions, concerns or worsening of condition, if unable to reach our office, patient should report to the ED. Ms. Sharita Jones has a reminder for a \"due or due soon\" health maintenance. I have asked that she contact her primary care provider for a follow-up on this health maintenance. Total time spent with the patient 30 minutes.

## 2021-03-02 NOTE — PATIENT INSTRUCTIONS
Patient Instructions 1. Remember hydration goals - minimum of 64 ounces of liquids per day (dehydration is the number one reason for hospital readmission). 2. Continue to monitor carbohydrate and protein intake you need a minimum of  Grams of protein daily- remember to keep your total carbohydrates to 50 grams or less per day for best results. 3. Continue to work towards exercise goals - 60-90 minutes, 5 times a week minimum of deliberate, aerobic exercise is the ultimate goal with strength training 2 times each week. Refer to [employer] for  information. 4. Remember to take vitamins as directed. 5. Attend support group the 2nd Thursday of each month. 6.  Constipation: Milk of Magnesia is for immediate relief only. Miralax is to be used every day if constipation is a chronic problem. 7.  Diarrhea: patients will occasionally develop lactose intolerance after surgery. Check to see if your protein shake has whey in it. If it does try a protein powder or drink that does not have whey and stop all yogurts, cheeses and milks to see if the diarrhea goes away. 8.  If you have had labs drawn. We will only call you if you have abnormal results. Otherwise you can access the lab results in \"mychart\". You will only need the access code the first time you sign on. 9.  Call us at [phone] or email us through [email or web address]" with questions,     concerns or worsening of condition, we have someone on call 24 hours a day. If you are unable to reach our office, you are to go to your Primary Care Physician or the Emergency Department. NOTE TO GASTRIC BYPASS PATIENTS:  (SAME APPLIES TO GASTRIC SLEEVE PATIENTS FOR FIRST TWO MONTHS) Remember that for the rest of your life, you are not able to take the following: 
- NSAIDs (ibuprofen, goody powder, BC powder, Motrin, Advil, Mobic, Voltaren, Excedrin, etc.) - Steroid pills or injections - Smoke (cigarettes or recreational drugs) - Alcohol Use of any of the above may cause ulcers in your stomach which may perforate causing a medical emergency and surgery. Speak to our medical staff if another medical provider requires you to take steroids or NSAIDs. Supplement Resource Guide Importance of Protein:  
Maintains lean body mass, produces antibodies to fight off infections, heals wounds, minimizes hair loss, helps to give you energy, helps with satiety, and keeping you full between meals. Importance of Calcium: 
Needed for healthy bones and teeth, normal blood clotting, and nervous system functioning, higher risk of osteoporosis and bone disease with non-compliance. Importance of Multivitamins: Many functions. Supply you with extra nutrients that you may be missing from food. May lead to iron deficiency anemia, weakness, fatigue, and many other symptoms with non-compliance. Importance of B Vitamins: 
Important for red blood cell formation, metabolism, energy, and helps to maintain a healthy nervous system. Protein Supplement Find one you like now. Use immediately after surgery. Look for: 
35-50g protein each day from your protein supplement once you reach the progression diet. 0-3 g fat per serving 0-3 g sugar per serving Protein drinks should be split in separate dosages. Recommend: Lifelong 1 year + Calcium Supplement:  
 
Start taking within a month after surgery. Look for: Calcium Citrate Plus D (1500 mg per day) Recommend: Citracal 
 
 . Avoid chocolate chewable calcium. Can use chewable bariatric or [employer] brand or similar chewable. The body cannot absorb more than 500-600 mg of calcium at a time. Take for Life Multi-vitamin Supplement:   
 
Start immediately after surgery: any complete chewable, such as: [employer]s Complete chewables. Avoid [place] sours or gummies. They lack iron and other important nutrients and also have added sugar. Continue with chewable vitamin or change to adult complete multivitamin one month after surgery. Menstruating women can take a prenatal vitamin. Make sure has at least 18 mg iron and 465-794 mcg folic acid Vitamin B12, B Complex Vitamin, and Biotin Start taking within a month after surgery. Vitamin B12:  1000 mcg of Vitamin B12 three times weekly Must take sublingually (meaning you take it under your tongue) or in a liquid drop form for easy absorption. B Complex Vitamin: Take a pill or liquid drop form once daily. Biotin: This vitamin can help prevent hair loss. Recommend 5mg  
(5000 mcg) a day Biotin is Optional  
 
 
 
 
  
Learning About Physical Activity What is physical activity? Physical activity is any kind of activity that gets your body moving. The types of physical activity that can help you get fit and stay healthy include: · Aerobic or \"cardio\" activities that make your heart beat faster and make you breathe harder, such as brisk walking, riding a bike, or running. Aerobic activities strengthen your heart and lungs and build up your endurance. · Strength training activities that make your muscles work against, or \"resist,\" something, such as lifting weights or doing push-ups. These activities help tone and strengthen your muscles. · Stretches that allow you to move your joints and muscles through their full range of motion. Stretching helps you be more flexible and avoid injury. What are the benefits of physical activity? Being active is one of the best things you can do for your health. It helps you to: · Feel stronger and have more energy to do all the things you like to do. · Focus better at school or work. · Feel, think, and sleep better. · Reach and stay at a healthy weight. · Lose fat and build lean muscle. · Lower your risk for serious health problems. 
· Keep your bones, muscles, and joints strong. 
How can you make physical activity part of your life? 
Get at least 30 minutes of exercise on most days of the week. Walking is a good choice. You also may want to do other activities, such as running, swimming, cycling, or playing tennis or team sports. 
Pick activities that you like—ones that make your heart beat faster, your muscles stronger, and your muscles and joints more flexible. If you find more than one thing you like doing, do them all. You don't have to do the same thing every day. 
Get your heart pumping every day. Any activity that makes your heart beat faster and keeps it at that rate for a while counts. 
Here are some great ways to get your heart beating faster: 
· Go for a brisk walk, run, or bike ride. 
· Go for a hike or swim. 
· Go in-line skating. 
· Play a game of touch football, basketball, or soccer. 
· Ride a bike. 
· Play tennis or racquetball. 
· Climb stairs. 
Even some household chores can be aerobic—just do them at a faster pace. Vacuuming, raking or mowing the lawn, sweeping the garage, and washing and waxing the car all can help get your heart rate up. 
Strengthen your muscles during the week. You don't have to lift heavy weights or grow big, bulky muscles to get stronger. Doing a few simple activities that make your muscles work against, or \"resist,\" something can help you get stronger. 
For example, you can: 
· Do push-ups or sit-ups, which use your own body weight as resistance. 
· Lift weights or dumbbells or use stretch bands at home or in a gym or community center. 
Stretch your muscles often. Stretching will help you as you become more active. It can help you stay flexible, loosen tight muscles, and avoid injury. It can also help improve your balance and posture and can be a great way to relax. 
 Be sure to stretch the muscles you'll be using when you work out. It's best to warm your muscles slightly before you stretch them. Walk or do some other light aerobic activity for a few minutes, and then start stretching. When you stretch your muscles: · Do it slowly. Stretching is not about going fast or making sudden movements. · Don't push or bounce during a stretch. · Hold each stretch for at least 15 to 30 seconds, if you can. You should feel a stretch in the muscle, but not pain. · Breathe out as you do the stretch. Then breathe in as you hold the stretch. Don't hold your breath. If you're worried about how more activity might affect your health, have a checkup before you start. Follow any special advice your doctor gives you for getting a smart start. Where can you learn more? Go to http://www.gray.com/ Enter X454 in the search box to learn more about \"Learning About Physical Activity. \" Current as of: January 16, 2020               Content Version: 12.6 © 6819-6959 [employer]. Care instructions adapted under license by [employer] (which disclaims liability or warranty for this information). If you have questions about a medical condition or this instruction, always ask your healthcare professional. [address] any warranty or liability for your use of this information.

## 2021-11-18 ENCOUNTER — DOCUMENTATION ONLY (OUTPATIENT)
Dept: SURGERY | Age: 66
End: 2021-11-18

## 2021-11-18 NOTE — PROGRESS NOTES
Per [facility] requirements;  E-mail and letter sent for follow up appointment. [address] [facility] Surgical Specialist  [address]   [address], [address]                 [address] [facility] [place]  [facility]  [facility]      Dear Patient,    Your health is our main concern. It is important for your health to have follow-up lab work and to see your surgeon at 2 months, 4 months, 6 months, 9 months and annually after your weight loss surgery. Additionally, the Department of Bariatric Surgery at our hospital is a member of the Metabolic and Bariatric Surgery Accreditation and Quality Improvement Program [facility]). As a participant in this program, we gather information on the outcomes of our patients after surgery. Please call the office for a follow up appointment at [phone]. If you have moved out of the area or have changed surgeons please call us and let us know the name of your doctor. Your health and feedback are important to us. We greatly appreciate your response.        Thank you,  [address] [facility] [address] [facility]

## 2022-11-09 ENCOUNTER — DOCUMENTATION ONLY (OUTPATIENT)
Dept: SURGERY | Age: 67
End: 2022-11-09

## 2022-11-09 NOTE — PROGRESS NOTES
Per [facility] requirements;  E-mail and letter sent for follow up appointment. [facility]  [address]   [address], [address]                 [facility] [place]  [facility]  [facility]      Dear Patient,    Your health is our main concern. It is important for your health to have follow-up lab work and to see your surgeon at 3 months, 6 months, 9 months and annually after your weight loss surgery. Additionally, the Department of Bariatric Surgery at our hospital is a member of the Metabolic and Bariatric Surgery Accreditation and Quality Improvement Program [facility]). As a participant in this program, we gather information on the outcomes of our patients after surgery. Please call the office for a follow up appointment at [phone]. If you have moved out of the area or have changed surgeons please call us and let us know the name of your doctor. Your health and feedback are important to us. We greatly appreciate your response.        Thank you,  [facility] [address]